# Patient Record
Sex: MALE
[De-identification: names, ages, dates, MRNs, and addresses within clinical notes are randomized per-mention and may not be internally consistent; named-entity substitution may affect disease eponyms.]

---

## 2020-08-24 PROBLEM — Z00.00 ENCOUNTER FOR PREVENTIVE HEALTH EXAMINATION: Status: ACTIVE | Noted: 2020-08-24

## 2020-08-29 ENCOUNTER — APPOINTMENT (OUTPATIENT)
Dept: DISASTER EMERGENCY | Facility: CLINIC | Age: 55
End: 2020-08-29

## 2020-08-30 LAB — SARS-COV-2 N GENE NPH QL NAA+PROBE: NOT DETECTED

## 2020-11-01 ENCOUNTER — APPOINTMENT (OUTPATIENT)
Dept: DISASTER EMERGENCY | Facility: CLINIC | Age: 55
End: 2020-11-01

## 2020-11-02 LAB — SARS-COV-2 N GENE NPH QL NAA+PROBE: NOT DETECTED

## 2021-04-04 ENCOUNTER — APPOINTMENT (OUTPATIENT)
Dept: DISASTER EMERGENCY | Facility: CLINIC | Age: 56
End: 2021-04-04

## 2021-04-05 LAB — SARS-COV-2 N GENE NPH QL NAA+PROBE: NOT DETECTED

## 2021-04-18 ENCOUNTER — APPOINTMENT (OUTPATIENT)
Dept: DISASTER EMERGENCY | Facility: CLINIC | Age: 56
End: 2021-04-18

## 2021-04-18 DIAGNOSIS — Z01.818 ENCOUNTER FOR OTHER PREPROCEDURAL EXAMINATION: ICD-10-CM

## 2021-04-19 LAB — SARS-COV-2 N GENE NPH QL NAA+PROBE: NOT DETECTED

## 2022-06-21 ENCOUNTER — APPOINTMENT (OUTPATIENT)
Dept: ORTHOPEDIC SURGERY | Facility: CLINIC | Age: 57
End: 2022-06-21
Payer: OTHER MISCELLANEOUS

## 2022-06-21 VITALS — HEIGHT: 68 IN | WEIGHT: 230 LBS | BODY MASS INDEX: 34.86 KG/M2

## 2022-06-21 DIAGNOSIS — I10 ESSENTIAL (PRIMARY) HYPERTENSION: ICD-10-CM

## 2022-06-21 DIAGNOSIS — Z82.49 FAMILY HISTORY OF ISCHEMIC HEART DISEASE AND OTHER DISEASES OF THE CIRCULATORY SYSTEM: ICD-10-CM

## 2022-06-21 DIAGNOSIS — M75.122 COMPLETE ROTATOR CUFF TEAR OR RUPTURE OF LEFT SHOULDER, NOT SPECIFIED AS TRAUMATIC: ICD-10-CM

## 2022-06-21 PROCEDURE — 99455 WORK RELATED DISABILITY EXAM: CPT

## 2022-06-21 PROCEDURE — 99072 ADDL SUPL MATRL&STAF TM PHE: CPT

## 2022-06-24 PROBLEM — Z82.49 FAMILY HISTORY OF HYPERTENSION: Status: ACTIVE | Noted: 2022-06-21

## 2022-06-24 PROBLEM — M75.122 COMPLETE TEAR OF LEFT ROTATOR CUFF, UNSPECIFIED WHETHER TRAUMATIC: Status: ACTIVE | Noted: 2022-06-24

## 2022-06-28 ENCOUNTER — APPOINTMENT (OUTPATIENT)
Dept: ORTHOPEDIC SURGERY | Facility: CLINIC | Age: 57
End: 2022-06-28

## 2022-06-28 NOTE — DISCUSSION/SUMMARY
[de-identified] : General Dx Discussion\par The patient was advised of the diagnosis. The natural history of the pathology was explained in full to the patient in layman's terms. All questions were answered. The risks and benefits of surgical and non-surgical treatment alternatives were explained in full to the patient.\par \par

## 2022-06-28 NOTE — REASON FOR VISIT
[FreeTextEntry2] : WC follow up for left shoulder\par pt here for [facility] lt shoulder \par working full time full duty

## 2022-06-28 NOTE — HISTORY OF PRESENT ILLNESS
[Work related] : work related [Sudden] : sudden [2] : 2 [1] : 2 [Localized] : localized [Sleep] : sleep [Lying in bed] : lying in bed [Full time] : Work status: full time [] : no [FreeTextEntry1] : left shoulder [FreeTextEntry3] : 5-15-19 [FreeTextEntry9] : home exercises [de-identified] :  [de-identified] : Dr. Marie

## 2022-06-28 NOTE — PHYSICAL EXAM
[Right] : right shoulder [Standing] : standing [Left] : left shoulder [NL (0-180)] : full active abduction 0-180 degrees [NL (0-90)] : full external rotation 0-90 degrees [5 ___] : forward flexion 5[unfilled]/5 [5___] : internal rotation 5[unfilled]/5 [] : negative drop-arm test [FreeTextEntry9] : adduction 15 deg ( normal 30 deg)  [TWNoteComboBox7] : active forward flexion 160 degrees [de-identified] : active abduction 160 degrees [TWNoteComboBox6] : internal rotation 60 degrees [de-identified] : external rotation 70 degrees

## 2023-01-04 ENCOUNTER — RX ONLY (RX ONLY)
Age: 58
End: 2023-01-04

## 2023-01-04 ENCOUNTER — OFFICE (OUTPATIENT)
Dept: URBAN - METROPOLITAN AREA CLINIC 12 | Facility: CLINIC | Age: 58
Setting detail: OPHTHALMOLOGY
End: 2023-01-04
Payer: COMMERCIAL

## 2023-01-04 DIAGNOSIS — H11.001: ICD-10-CM

## 2023-01-04 DIAGNOSIS — H11.153: ICD-10-CM

## 2023-01-04 DIAGNOSIS — H25.13: ICD-10-CM

## 2023-01-04 DIAGNOSIS — H10.813: ICD-10-CM

## 2023-01-04 PROCEDURE — 92012 INTRM OPH EXAM EST PATIENT: CPT | Performed by: OPTOMETRIST

## 2023-01-04 ASSESSMENT — REFRACTION_MANIFEST
OD_AXIS: 145
OS_ADD: +2.25
OD_ADD: +2.25
OD_SPHERE: +1.25
OU_VA: 20/20-1
OD_CYLINDER: -0.50
OS_SPHERE: +1.25
OS_VA1: 20/20-1
OD_VA1: 20/20-1
OS_CYLINDER: -0.50
OS_AXIS: 65

## 2023-01-04 ASSESSMENT — AXIALLENGTH_DERIVED
OD_AL: 23.5184
OS_AL: 23.6155
OS_AL: 23.4701
OD_AL: 23.6155

## 2023-01-04 ASSESSMENT — REFRACTION_AUTOREFRACTION
OD_CYLINDER: -1.00
OS_SPHERE: +1.75
OD_SPHERE: +1.75
OS_AXIS: 080
OS_CYLINDER: -0.75
OD_AXIS: 158

## 2023-01-04 ASSESSMENT — REFRACTION_CURRENTRX
OS_ADD: +2.00
OS_CYLINDER: SPHERE
OS_SPHERE: +1.00
OS_VPRISM_DIRECTION: BF
OD_VPRISM_DIRECTION: BF
OD_ADD: +2.00
OD_OVR_VA: 20/
OD_CYLINDER: SPHERE
OD_SPHERE: +1.25
OS_OVR_VA: 20/

## 2023-01-04 ASSESSMENT — CONFRONTATIONAL VISUAL FIELD TEST (CVF)
OD_FINDINGS: FULL
OS_FINDINGS: FULL

## 2023-01-04 ASSESSMENT — VISUAL ACUITY
OD_BCVA: 20/50-2
OS_BCVA: 20/25-2

## 2023-01-04 ASSESSMENT — KERATOMETRY
OS_K2POWER_DIOPTERS: 42.50
OD_K2POWER_DIOPTERS: 43.25
OD_K1POWER_DIOPTERS: 41.50
OS_K1POWER_DIOPTERS: 42.25
OD_AXISANGLE_DEGREES: 068
OS_AXISANGLE_DEGREES: 145
METHOD_AUTO_MANUAL: AUTO

## 2023-01-04 ASSESSMENT — SPHEQUIV_DERIVED
OD_SPHEQUIV: 1.25
OS_SPHEQUIV: 1
OS_SPHEQUIV: 1.375
OD_SPHEQUIV: 1

## 2023-01-04 ASSESSMENT — CORNEAL PTERYGIUM: OD_PTERYGIUM: NASAL 3MM 2MM

## 2023-01-04 ASSESSMENT — TONOMETRY
OS_IOP_MMHG: 12
OD_IOP_MMHG: 16

## 2023-01-12 ENCOUNTER — OFFICE (OUTPATIENT)
Dept: URBAN - METROPOLITAN AREA CLINIC 12 | Facility: CLINIC | Age: 58
Setting detail: OPHTHALMOLOGY
End: 2023-01-12
Payer: COMMERCIAL

## 2023-01-12 ENCOUNTER — RX ONLY (RX ONLY)
Age: 58
End: 2023-01-12

## 2023-01-12 DIAGNOSIS — H10.813: ICD-10-CM

## 2023-01-12 DIAGNOSIS — H43.393: ICD-10-CM

## 2023-01-12 DIAGNOSIS — H11.001: ICD-10-CM

## 2023-01-12 DIAGNOSIS — H43.812: ICD-10-CM

## 2023-01-12 DIAGNOSIS — H25.13: ICD-10-CM

## 2023-01-12 DIAGNOSIS — H11.153: ICD-10-CM

## 2023-01-12 PROCEDURE — 92014 COMPRE OPH EXAM EST PT 1/>: CPT | Performed by: OPTOMETRIST

## 2023-01-12 ASSESSMENT — REFRACTION_MANIFEST
OD_ADD: +2.25
OD_VA1: 20/20-1
OS_ADD: +2.25
OD_AXIS: 145
OS_SPHERE: +1.25
OS_VA1: 20/20-1
OS_AXIS: 65
OS_CYLINDER: -0.50
OD_SPHERE: +1.25
OU_VA: 20/20-1
OD_CYLINDER: -0.50

## 2023-01-12 ASSESSMENT — SPHEQUIV_DERIVED
OD_SPHEQUIV: 1.75
OD_SPHEQUIV: 1
OS_SPHEQUIV: 1
OS_SPHEQUIV: 1.5

## 2023-01-12 ASSESSMENT — REFRACTION_CURRENTRX
OS_CYLINDER: SPHERE
OD_ADD: +2.00
OS_OVR_VA: 20/
OD_CYLINDER: SPHERE
OS_VPRISM_DIRECTION: BF
OD_SPHERE: +1.25
OD_VPRISM_DIRECTION: BF
OS_ADD: +2.00
OD_OVR_VA: 20/
OS_SPHERE: +1.00

## 2023-01-12 ASSESSMENT — VISUAL ACUITY
OD_BCVA: 20/50-2
OS_BCVA: 20/20-2

## 2023-01-12 ASSESSMENT — CONFRONTATIONAL VISUAL FIELD TEST (CVF)
OS_FINDINGS: FULL
OD_FINDINGS: FULL

## 2023-01-12 ASSESSMENT — REFRACTION_AUTOREFRACTION
OS_SPHERE: +1.75
OD_SPHERE: +2.50
OD_CYLINDER: -1.50
OS_AXIS: 074
OS_CYLINDER: -0.50
OD_AXIS: 156

## 2023-01-12 ASSESSMENT — TONOMETRY
OS_IOP_MMHG: 18
OD_IOP_MMHG: 18

## 2023-01-12 ASSESSMENT — AXIALLENGTH_DERIVED
OS_AL: 23.4673
OD_AL: 23.2815
OS_AL: 23.6616
OD_AL: 23.5696

## 2023-01-12 ASSESSMENT — KERATOMETRY
OD_K2POWER_DIOPTERS: 43.25
OS_AXISANGLE_DEGREES: 013
METHOD_AUTO_MANUAL: AUTO
OD_K1POWER_DIOPTERS: 41.75
OD_AXISANGLE_DEGREES: 075
OS_K1POWER_DIOPTERS: 41.75
OS_K2POWER_DIOPTERS: 42.75

## 2023-01-12 ASSESSMENT — CORNEAL PTERYGIUM: OD_PTERYGIUM: NASAL 3MM 2MM

## 2023-04-20 PROBLEM — H11.152 PINGUECULA ; LEFT EYE: Status: ACTIVE | Noted: 2023-04-04
